# Patient Record
Sex: FEMALE | Race: WHITE | NOT HISPANIC OR LATINO | URBAN - METROPOLITAN AREA
[De-identification: names, ages, dates, MRNs, and addresses within clinical notes are randomized per-mention and may not be internally consistent; named-entity substitution may affect disease eponyms.]

---

## 2022-04-13 ENCOUNTER — APPOINTMENT (OUTPATIENT)
Age: 42
Setting detail: DERMATOLOGY
End: 2022-04-13

## 2022-04-13 DIAGNOSIS — Z41.9 ENCOUNTER FOR PROCEDURE FOR PURPOSES OTHER THAN REMEDYING HEALTH STATE, UNSPECIFIED: ICD-10-CM

## 2022-04-13 PROCEDURE — OTHER COSMETIC CONSULTATION: SKIN CARE PRODUCTS AND SERVICES: OTHER

## 2022-04-13 ASSESSMENT — LOCATION DETAILED DESCRIPTION DERM: LOCATION DETAILED: RIGHT INFERIOR MEDIAL FOREHEAD

## 2022-04-13 ASSESSMENT — LOCATION SIMPLE DESCRIPTION DERM: LOCATION SIMPLE: RIGHT FOREHEAD

## 2022-04-13 ASSESSMENT — LOCATION ZONE DERM: LOCATION ZONE: FACE

## 2022-04-20 ENCOUNTER — APPOINTMENT (OUTPATIENT)
Age: 42
Setting detail: DERMATOLOGY
End: 2022-04-22

## 2022-04-20 DIAGNOSIS — Z41.9 ENCOUNTER FOR PROCEDURE FOR PURPOSES OTHER THAN REMEDYING HEALTH STATE, UNSPECIFIED: ICD-10-CM

## 2022-04-20 PROCEDURE — OTHER ALMA LASER: OTHER

## 2022-04-20 ASSESSMENT — LOCATION SIMPLE DESCRIPTION DERM: LOCATION SIMPLE: LEFT FOREHEAD

## 2022-04-20 ASSESSMENT — LOCATION ZONE DERM: LOCATION ZONE: FACE

## 2022-04-20 ASSESSMENT — LOCATION DETAILED DESCRIPTION DERM: LOCATION DETAILED: LEFT INFERIOR MEDIAL FOREHEAD

## 2022-04-20 NOTE — PROCEDURE: ALMA LASER
Treatment Number: 1
Handpiece: Bipolar
Fluence (J/Cm2): 5
Total Energy (Kj): 0.1
Exposure Time (In Sec): 90
Pulse Mode (Optional): L
Power (Wright): Low (10w)
Er:Yag Post-Care Generic: Post care reviewed with patient.
Energy In Kj: 50
Spot Size In Mm: 3
Shr Post-Care: Patient tolerated treatment well.  Calm & Correct Serum was applied post-tx.
Handpiece: ForMune Pro 5
St Module Post-Care: Patient's skin was cleansed with mild gel wash.  Eight passes were performed entire face and neck, avoiding trachea, left side first, then right side with a thick layer of ultrasound gel.  Gel was removed, Calm & Correct serum and Lumiere eye cream applied with Journee SPF 30 post treatment.
Ipl Post-Care: Patient tolerated treatment well.  Calm & Correct Serum and SPF 46 applied post-tx
Nd:Yag Post-Care: Immediate endpoint whitening and pinpoint bleeding. Vaseline and ice applied. Post care reviewed with patient.
Consent: Written consent obtained, risks reviewed including but not limited to crusting, scabbing, blistering, scarring, darker or lighter pigmentary change, systemic reactions, ulceration, incidental hair removal, bruising, and/or incomplete removal.
Clear Lift Post-Care: Post care reviewed with patient. Patient should avoid direct sunlight and wear broad spectrum sunscreen daily.
External Cooling: Gini Cryo 5
Post-Care Instructions: I reviewed with the patient in detail post-care instructions. Patient should avoid sunlight and wear sun protection.
Energy (Mj/P): 600
Repetition Rate (Hz): 0.5
 Post-Care: Bikini - 18j/cm2-30mns  #1 of \\n$120 PAN (regular price $150)
Treatment Fluence In Mj: 15
Pulse Width: 30 ms
Pulse Type: I
Stacks: 0
Post-Care To Use?: Generic
Matrix Dots: 7 x 7
Fluence (J/Cm2): 7
Pulse Duration In Ms: 10
Energy In Mj: 400
Price (Use Numbers Only, No Special Characters Or $): 400.00
Frequency In Hz: Unibody
Frequency In Hz: 2
Detail Level: Zone

## 2022-05-16 ENCOUNTER — APPOINTMENT (OUTPATIENT)
Age: 42
Setting detail: DERMATOLOGY
End: 2022-05-21

## 2022-05-16 VITALS — TEMPERATURE: 98 F

## 2022-05-16 DIAGNOSIS — L71.8 OTHER ROSACEA: ICD-10-CM

## 2022-05-16 PROCEDURE — OTHER MIPS QUALITY: OTHER

## 2022-05-16 PROCEDURE — OTHER PRESCRIPTION: OTHER

## 2022-05-16 PROCEDURE — 99213 OFFICE O/P EST LOW 20 MIN: CPT

## 2022-05-16 PROCEDURE — OTHER COUNSELING: OTHER

## 2022-05-16 PROCEDURE — OTHER PRODUCT LINE (ELTA MD): OTHER

## 2022-05-16 RX ORDER — OXYMETAZOLINE HYDROCHLORIDE 1 G/100G
CREAM TOPICAL
Qty: 30 | Refills: 0 | Status: CANCELLED | COMMUNITY
Start: 2022-05-16

## 2022-05-16 RX ORDER — SULFACETAMIDE SODIUM, SULFUR 98; 48 MG/G; MG/G
LIQUID TOPICAL
Qty: 285 | Refills: 0 | Status: ERX | COMMUNITY
Start: 2022-05-16

## 2022-05-16 ASSESSMENT — LOCATION DETAILED DESCRIPTION DERM
LOCATION DETAILED: NASAL SUPRATIP
LOCATION DETAILED: LEFT INFERIOR CENTRAL MALAR CHEEK
LOCATION DETAILED: LEFT INFERIOR MEDIAL MALAR CHEEK
LOCATION DETAILED: LEFT CENTRAL MALAR CHEEK
LOCATION DETAILED: RIGHT INFERIOR CENTRAL MALAR CHEEK
LOCATION DETAILED: RIGHT CENTRAL MALAR CHEEK

## 2022-05-16 ASSESSMENT — LOCATION SIMPLE DESCRIPTION DERM
LOCATION SIMPLE: NOSE
LOCATION SIMPLE: LEFT CHEEK
LOCATION SIMPLE: RIGHT CHEEK

## 2022-05-16 ASSESSMENT — LOCATION ZONE DERM
LOCATION ZONE: FACE
LOCATION ZONE: NOSE

## 2022-05-16 NOTE — HPI: RASH (ROSACEA)
How Severe Is Your Rosacea?: moderate
Is This A New Presentation, Or A Follow-Up?: Rosacea
Additional History: Has had IPL

## 2022-05-16 NOTE — PROCEDURE: PRODUCT LINE (ELTA MD)
Product 23 Price (In Dollars - Numeric Only, No Special Characters Or $): 0.00
Product 19 Units: 0
Product 4 Application Directions: Apply to entire face every morning, after applying medications.
Send Charges To Patient Encounter: No
Name Of Product 2: UV Clear (untinted)
Name Of Product 1: UV Clear tinted
Name Of Product 3: UV Daily
Assigning Risk Information: Per AMA, level of risk is based upon consequences of the problem(s) addressed at the encounter when appropriately treated. Risk also includes medical decision making related to the need to initiate or forego further testing, treatment and/or hospitalization. Over the counter medication are assigned a risk level of low. Prescription medication management is assigned a risk level of moderate.
Product 1 Units: 1
Detail Level: Zone
Name Of Product 4: UV SPORT
Risk Of Complication Category: Low (OTC Medications)
Allow Plan To Count Towards E/M Coding: Yes

## 2022-05-18 ENCOUNTER — APPOINTMENT (OUTPATIENT)
Age: 42
Setting detail: DERMATOLOGY
End: 2022-05-19

## 2022-05-18 DIAGNOSIS — Z41.9 ENCOUNTER FOR PROCEDURE FOR PURPOSES OTHER THAN REMEDYING HEALTH STATE, UNSPECIFIED: ICD-10-CM

## 2022-05-18 PROCEDURE — OTHER ALMA LASER: OTHER

## 2022-05-18 ASSESSMENT — LOCATION DETAILED DESCRIPTION DERM: LOCATION DETAILED: RIGHT INFERIOR MEDIAL FOREHEAD

## 2022-05-18 ASSESSMENT — LOCATION SIMPLE DESCRIPTION DERM: LOCATION SIMPLE: RIGHT FOREHEAD

## 2022-05-18 ASSESSMENT — LOCATION ZONE DERM: LOCATION ZONE: FACE

## 2022-05-18 NOTE — PROCEDURE: ALMA LASER
Treatment Number: 1
Pulse Mode (Optional): L
Pulse Width: 30 ms
 Post-Care: Bikini - 18j/cm2-30mns  #1 of \\n$120 PAN (regular price $150)
Er:Yag Post-Care Generic: Post care reviewed with patient.
Frequency In Hz: 2
Nd:Yag Post-Care: Immediate endpoint whitening and pinpoint bleeding. Vaseline and ice applied. Post care reviewed with patient.
Stacks: 0
Fluence (J/Cm2): 7
Repetition Rate (Hz): 0.5
External Cooling Fan Speed: 5
Energy (Mj/P): 600
Post-Care To Use?: Generic
Spot Size In Mm: 3
Power In Wright: 50
Handpiece: Vizify Pro 5
Total Energy (Kj): 0.1
Power (Wright): Low (10w)
Shr Post-Care: Patient tolerated treatment well.  Calm & Correct Serum was applied post-tx.
Consent: Written consent obtained, risks reviewed including but not limited to crusting, scabbing, blistering, scarring, darker or lighter pigmentary change, systemic reactions, ulceration, incidental hair removal, bruising, and/or incomplete removal.
Matrix Dots: 7 x 7
Price (Use Numbers Only, No Special Characters Or $): 400.00
Ipl Post-Care: Patient tolerated treatment well.  Calm & Correct Serum and SPF 46 applied post-tx
Pulse Type: I
Clear Lift Post-Care: Post care reviewed with patient. Patient should avoid direct sunlight and wear broad spectrum sunscreen daily.
Energy In Mj: 400
Post-Care Instructions: I reviewed with the patient in detail post-care instructions. Patient should avoid sunlight and wear sun protection.
St Module Post-Care: Patient's skin was cleansed with mild gel wash.  Eight passes were performed entire face and neck, avoiding trachea, left side first, then right side with a thick layer of ultrasound gel.  Gel was removed, Calm & Correct serum and Lumiere eye cream applied with Journee SPF 30 post treatment.
Pulse Duration In Ms: 10
Exposure Time (In Sec): 90
Treatment Fluence In Mj: 15
Detail Level: Zone
Frequency In Hz: Unibody
External Cooling: Gini Cryo 5
Handpiece: Bipolar

## 2022-06-15 ENCOUNTER — APPOINTMENT (OUTPATIENT)
Age: 42
Setting detail: DERMATOLOGY
End: 2022-06-15

## 2022-06-15 DIAGNOSIS — Z41.9 ENCOUNTER FOR PROCEDURE FOR PURPOSES OTHER THAN REMEDYING HEALTH STATE, UNSPECIFIED: ICD-10-CM

## 2022-06-15 PROCEDURE — OTHER ALMA LASER: OTHER

## 2022-06-15 ASSESSMENT — LOCATION ZONE DERM: LOCATION ZONE: FACE

## 2022-06-15 ASSESSMENT — LOCATION SIMPLE DESCRIPTION DERM: LOCATION SIMPLE: INFERIOR FOREHEAD

## 2022-06-15 ASSESSMENT — LOCATION DETAILED DESCRIPTION DERM: LOCATION DETAILED: INFERIOR MID FOREHEAD

## 2022-06-15 NOTE — PROCEDURE: ALMA LASER
Pulse Width: 30 ms
Treatment Number: 1
Pulse Type: I
Er:Yag Post-Care Generic: Post care reviewed with patient.
External Cooling: Gini Cryo 5
St Module Post-Care: Patient's skin was cleansed with mild gel wash.  Eight passes were performed entire face and neck, avoiding trachea, left side first, then right side with a thick layer of ultrasound gel.  Gel was removed, Calm & Correct serum and Lumiere eye cream applied with Journee SPF 30 post treatment.
Stacks: 0
Detail Level: Zone
Matrix Dots: 7 x 7
Handpiece: View3 Pro 5
Post-Care To Use?: Generic
Fluence (Mj/Cm2): 7
Power In Wright: 50
Post-Care Instructions: I reviewed with the patient in detail post-care instructions. Patient should avoid sunlight and wear sun protection.
Energy (Mj/P): 600
Frequency In Hz: Unibody
Pulse Mode (Optional): L
Power (Wright): Low (10w)
Pulse Duration In Ms: 10
Repetition Rate (Hz): 0.5
Frequency In Hz: 2
Treatment Fluence In Mj: 15
Consent: Written consent obtained, risks reviewed including but not limited to crusting, scabbing, blistering, scarring, darker or lighter pigmentary change, systemic reactions, ulceration, incidental hair removal, bruising, and/or incomplete removal.
 Post-Care: Bikini - 18j/cm2-30mns  #1 of \\n$120 PAN (regular price $150)
Brant Post-Care: Immediate endpoint whitening and pinpoint bleeding. Vaseline and ice applied. Post care reviewed with patient.
Price (Use Numbers Only, No Special Characters Or $): 400.00
Handpiece: Bipolar
Spot Size In Mm: 3
Ipl Post-Care: Patient tolerated treatment well.  Calm & Correct Serum and SPF 46 applied post-tx
Shr Post-Care: Patient tolerated treatment well.  Calm & Correct Serum was applied post-tx.
Fluence (J/Cm2): 5
Exposure Time (In Sec): 90
Total Energy (Kj): 0.1
Clear Lift Post-Care: Post care reviewed with patient. Patient should avoid direct sunlight and wear broad spectrum sunscreen daily.
Energy In Mj: 400

## 2022-06-16 ENCOUNTER — APPOINTMENT (OUTPATIENT)
Age: 42
Setting detail: DERMATOLOGY
End: 2022-06-16

## 2022-06-16 DIAGNOSIS — L71.8 OTHER ROSACEA: ICD-10-CM

## 2022-06-16 PROCEDURE — OTHER MIPS QUALITY: OTHER

## 2022-06-16 PROCEDURE — OTHER TREATMENT REGIMEN: OTHER

## 2022-06-16 PROCEDURE — OTHER PRODUCT LINE (ELTA MD): OTHER

## 2022-06-16 PROCEDURE — 99213 OFFICE O/P EST LOW 20 MIN: CPT

## 2022-06-16 PROCEDURE — OTHER COUNSELING: OTHER

## 2022-06-16 PROCEDURE — OTHER PRESCRIPTION: OTHER

## 2022-06-16 RX ORDER — SULFACETAMIDE SODIUM, SULFUR 98; 48 MG/G; MG/G
LIQUID TOPICAL
Qty: 285 | Refills: 0 | Status: ERX

## 2022-06-16 RX ORDER — TAZAROTENE 0.45 MG/G
LOTION TOPICAL
Qty: 45 | Refills: 0 | Status: ERX | COMMUNITY
Start: 2022-06-16

## 2022-06-16 ASSESSMENT — LOCATION DETAILED DESCRIPTION DERM
LOCATION DETAILED: NASAL SUPRATIP
LOCATION DETAILED: LEFT INFERIOR MEDIAL MALAR CHEEK
LOCATION DETAILED: RIGHT CENTRAL MALAR CHEEK
LOCATION DETAILED: LEFT INFERIOR CENTRAL MALAR CHEEK
LOCATION DETAILED: LEFT CENTRAL MALAR CHEEK
LOCATION DETAILED: RIGHT INFERIOR CENTRAL MALAR CHEEK

## 2022-06-16 ASSESSMENT — LOCATION ZONE DERM
LOCATION ZONE: NOSE
LOCATION ZONE: FACE

## 2022-06-16 ASSESSMENT — LOCATION SIMPLE DESCRIPTION DERM
LOCATION SIMPLE: RIGHT CHEEK
LOCATION SIMPLE: NOSE
LOCATION SIMPLE: LEFT CHEEK

## 2022-06-16 NOTE — PROCEDURE: PRODUCT LINE (ELTA MD)
Product 13 Price (In Dollars - Numeric Only, No Special Characters Or $): 0.00
Send Charges To Patient Encounter: No
Allow Plan To Count Towards E/M Coding: Yes
Product 1 Units: 1
Product 4 Application Directions: Apply to entire face every morning, after applying medications.
Product 10 Units: 0
Name Of Product 2: UV Clear (untinted)
Name Of Product 4: UV SPORT
Name Of Product 3: UV Daily
Name Of Product 1: UV Clear tinted
Risk Of Complication Category: Low (OTC Medications)
Detail Level: Zone
Assigning Risk Information: Per AMA, level of risk is based upon consequences of the problem(s) addressed at the encounter when appropriately treated. Risk also includes medical decision making related to the need to initiate or forego further testing, treatment and/or hospitalization. Over the counter medication are assigned a risk level of low. Prescription medication management is assigned a risk level of moderate.

## 2022-06-16 NOTE — PROCEDURE: TREATMENT REGIMEN
Detail Level: Zone
Action 4: Continue
Plan: - Continue series 3 of 5 treatments of IPL (possibly more) with Dea\\n- stopping Arazlo 3 nights before
Discontinue Regimen: Rhofade (patient preference) since patient reported increased redness day after use
Initiate Regimen: Arazlo

## 2022-07-13 ENCOUNTER — APPOINTMENT (OUTPATIENT)
Age: 42
Setting detail: DERMATOLOGY
End: 2022-07-13

## 2022-07-13 DIAGNOSIS — Z41.9 ENCOUNTER FOR PROCEDURE FOR PURPOSES OTHER THAN REMEDYING HEALTH STATE, UNSPECIFIED: ICD-10-CM

## 2022-07-13 PROCEDURE — OTHER ALMA LASER: OTHER

## 2022-07-13 ASSESSMENT — LOCATION DETAILED DESCRIPTION DERM: LOCATION DETAILED: INFERIOR MID FOREHEAD

## 2022-07-13 ASSESSMENT — LOCATION ZONE DERM: LOCATION ZONE: FACE

## 2022-07-13 ASSESSMENT — LOCATION SIMPLE DESCRIPTION DERM: LOCATION SIMPLE: INFERIOR FOREHEAD

## 2022-07-13 NOTE — PROCEDURE: ALMA LASER
Pulse Type: I
Stacks: 0
Handpiece: ForceManager Pro 5
Treatment Number: 1
Post-Care Instructions: I reviewed with the patient in detail post-care instructions. Patient should avoid sunlight and wear sun protection.
Detail Level: Zone
External Cooling: Gini Cryo 5
Brant Post-Care: Immediate endpoint whitening and pinpoint bleeding. Vaseline and ice applied. Post care reviewed with patient.
Er:Yag Post-Care Generic: Post care reviewed with patient.
Matrix Dots: 7 x 7
Consent: Written consent obtained, risks reviewed including but not limited to crusting, scabbing, blistering, scarring, darker or lighter pigmentary change, systemic reactions, ulceration, incidental hair removal, bruising, and/or incomplete removal.
Post-Care To Use?: Generic
Spot Size In Mm: 3
 Post-Care: Bikini - 18j/cm2-30mns  #1 of \\n$120 PAN (regular price $150)
Pulse Mode (Optional): L
Frequency In Hz: 2
External Cooling Fan Speed: 5
Treatment Fluence In Mj: 15
Fluence (Mj/Cm2): 7
Exposure Time (In Sec): 90
Power (Wright): Low (10w)
Shr Post-Care: Patient tolerated treatment well.  Calm & Correct Serum was applied post-tx.
Power In Wright: 50
Ipl Post-Care: Patient tolerated treatment well.  Calm & Correct Serum and SPF 46 applied post-tx
Handpiece: Bipolar
Total Energy (Kj): 0.1
St Module Post-Care: Patient's skin was cleansed with mild gel wash.  Eight passes were performed entire face and neck, avoiding trachea, left side first, then right side with a thick layer of ultrasound gel.  Gel was removed, Calm & Correct serum and Lumiere eye cream applied with Journee SPF 30 post treatment.
Clear Lift Post-Care: Post care reviewed with patient. Patient should avoid direct sunlight and wear broad spectrum sunscreen daily.
Pulse Width: 30 ms
Energy (Mj/P): 600
Energy In Mj: 400
Frequency In Hz: Unibody
Pulse Duration In Ms: 10
Repetition Rate (Hz): 0.5
Price (Use Numbers Only, No Special Characters Or $): 400.00

## 2022-08-10 ENCOUNTER — APPOINTMENT (OUTPATIENT)
Age: 42
Setting detail: DERMATOLOGY
End: 2022-08-10

## 2022-08-10 DIAGNOSIS — Z41.9 ENCOUNTER FOR PROCEDURE FOR PURPOSES OTHER THAN REMEDYING HEALTH STATE, UNSPECIFIED: ICD-10-CM

## 2022-08-10 PROCEDURE — OTHER ALMA LASER: OTHER

## 2022-08-10 ASSESSMENT — LOCATION ZONE DERM: LOCATION ZONE: FACE

## 2022-08-10 ASSESSMENT — LOCATION SIMPLE DESCRIPTION DERM: LOCATION SIMPLE: INFERIOR FOREHEAD

## 2022-08-10 ASSESSMENT — LOCATION DETAILED DESCRIPTION DERM: LOCATION DETAILED: INFERIOR MID FOREHEAD

## 2022-08-10 NOTE — PROCEDURE: ALMA LASER
Number Of Pulses: 1
Ipl Post-Care: Patient tolerated treatment well.  Calm & Correct Serum and SPF 46 applied post-tx
Handpiece: Bipolar
Pixel Post-Care: Post care reviewed with patient.
St Module Post-Care: Patient's skin was cleansed with mild gel wash.  Eight passes were performed entire face and neck, avoiding trachea, left side first, then right side with a thick layer of ultrasound gel.  Gel was removed, Calm & Correct serum and Lumiere eye cream applied with Journee SPF 30 post treatment.
Stacks: 0
Brant Post-Care: Immediate endpoint whitening and pinpoint bleeding. Vaseline and ice applied. Post care reviewed with patient.
Energy In Kj: 50
Frequency In Hz: Unibody
Spot Size In Mm: 3
Consent: Written consent obtained, risks reviewed including but not limited to crusting, scabbing, blistering, scarring, darker or lighter pigmentary change, systemic reactions, ulceration, incidental hair removal, bruising, and/or incomplete removal.
Pulse Type: I
Frequency In Hz: 2
Fluence (J/Cm2): 5
Power (Wright): Low (10w)
Energy In Mj: 400
Post-Care Instructions: I reviewed with the patient in detail post-care instructions. Patient should avoid sunlight and wear sun protection.
Exposure Time (In Sec): 90
Shr Post-Care: Patient tolerated treatment well.  Calm & Correct Serum was applied post-tx.
Matrix Dots: 7 x 7
Price (Use Numbers Only, No Special Characters Or $): 400.00
Detail Level: Zone
Post-Care To Use?: Generic
Pulse Mode (Optional): L
Handpiece: AW-Energy Pro 5
Fluence (J/Cm2): 7
Clear Lift Post-Care: Post care reviewed with patient. Patient should avoid direct sunlight and wear broad spectrum sunscreen daily.
Pulse Duration In Ms: 10
 Post-Care: Bikini - 18j/cm2-30mns  #1 of \\n$120 PAN (regular price $150)
Total Energy (Kj): 0.1
External Cooling: Gini Cryo 5
Treatment Fluence In Mj: 15
Repetition Rate (Hz): 0.5
Energy (Mj/P): 600
Pulse Width: 30 ms

## 2022-08-17 ENCOUNTER — APPOINTMENT (OUTPATIENT)
Age: 42
Setting detail: DERMATOLOGY
End: 2022-08-17

## 2022-08-17 VITALS — TEMPERATURE: 98 F

## 2022-08-17 DIAGNOSIS — L50.1 IDIOPATHIC URTICARIA: ICD-10-CM

## 2022-08-17 PROCEDURE — OTHER MIPS QUALITY: OTHER

## 2022-08-17 PROCEDURE — 99213 OFFICE O/P EST LOW 20 MIN: CPT

## 2022-08-17 PROCEDURE — OTHER COUNSELING: OTHER

## 2022-08-17 PROCEDURE — OTHER OTC TREATMENT REGIMEN: OTHER

## 2022-08-17 PROCEDURE — OTHER COUNSELING: TOPICAL STEROIDS: OTHER

## 2022-08-17 PROCEDURE — OTHER PRESCRIPTION: OTHER

## 2022-08-17 PROCEDURE — OTHER REFERRAL: OTHER

## 2022-08-17 RX ORDER — CLOCORTOLONE PIVALATE 0 G/G
CREAM TOPICAL
Qty: 30 | Refills: 0 | Status: ERX | COMMUNITY
Start: 2022-08-17

## 2022-08-17 ASSESSMENT — LOCATION SIMPLE DESCRIPTION DERM
LOCATION SIMPLE: CHIN
LOCATION SIMPLE: LEFT LIP
LOCATION SIMPLE: RIGHT FOREHEAD
LOCATION SIMPLE: LEFT FOREHEAD
LOCATION SIMPLE: RIGHT CHEEK
LOCATION SIMPLE: RIGHT LIP
LOCATION SIMPLE: LEFT CHEEK
LOCATION SIMPLE: INFERIOR FOREHEAD
LOCATION SIMPLE: LEFT ANTERIOR NECK
LOCATION SIMPLE: NECK

## 2022-08-17 ASSESSMENT — LOCATION DETAILED DESCRIPTION DERM
LOCATION DETAILED: LEFT INFERIOR CENTRAL MALAR CHEEK
LOCATION DETAILED: LEFT SUPERIOR ANTERIOR NECK
LOCATION DETAILED: RIGHT INFERIOR MEDIAL FOREHEAD
LOCATION DETAILED: LEFT CENTRAL MALAR CHEEK
LOCATION DETAILED: RIGHT MEDIAL MALAR CHEEK
LOCATION DETAILED: LEFT MEDIAL BUCCAL CHEEK
LOCATION DETAILED: RIGHT INFERIOR LATERAL MALAR CHEEK
LOCATION DETAILED: RIGHT CHIN
LOCATION DETAILED: RIGHT SUPERIOR CENTRAL BUCCAL CHEEK
LOCATION DETAILED: RIGHT LATERAL MALAR CHEEK
LOCATION DETAILED: RIGHT LOWER CUTANEOUS LIP
LOCATION DETAILED: RIGHT INFERIOR CENTRAL MALAR CHEEK
LOCATION DETAILED: RIGHT INFERIOR PREAURICULAR CHEEK
LOCATION DETAILED: LEFT CENTRAL BUCCAL CHEEK
LOCATION DETAILED: RIGHT CENTRAL MALAR CHEEK
LOCATION DETAILED: LEFT CENTRAL ANTERIOR NECK
LOCATION DETAILED: LEFT CENTRAL LATERAL NECK
LOCATION DETAILED: RIGHT FOREHEAD
LOCATION DETAILED: LEFT SUPERIOR LATERAL NECK
LOCATION DETAILED: RIGHT INFERIOR CENTRAL BUCCAL CHEEK
LOCATION DETAILED: RIGHT SUPERIOR MEDIAL BUCCAL CHEEK
LOCATION DETAILED: LEFT LATERAL SUBMANDIBULAR CHEEK
LOCATION DETAILED: LEFT INFERIOR FOREHEAD
LOCATION DETAILED: INFERIOR MID FOREHEAD
LOCATION DETAILED: LEFT MEDIAL FOREHEAD
LOCATION DETAILED: LEFT LATERAL MALAR CHEEK
LOCATION DETAILED: LEFT INFERIOR MEDIAL MALAR CHEEK
LOCATION DETAILED: RIGHT INFERIOR MEDIAL MALAR CHEEK
LOCATION DETAILED: RIGHT SUPERIOR CENTRAL MALAR CHEEK
LOCATION DETAILED: LEFT MEDIAL MALAR CHEEK
LOCATION DETAILED: RIGHT INFERIOR FOREHEAD
LOCATION DETAILED: LEFT MID PREAURICULAR CHEEK
LOCATION DETAILED: LEFT LOWER CUTANEOUS LIP
LOCATION DETAILED: RIGHT SUPERIOR LATERAL BUCCAL CHEEK
LOCATION DETAILED: RIGHT CENTRAL BUCCAL CHEEK

## 2022-08-17 ASSESSMENT — LOCATION ZONE DERM
LOCATION ZONE: FACE
LOCATION ZONE: LIP
LOCATION ZONE: NECK

## 2022-08-17 NOTE — PROCEDURE: COUNSELING: TOPICAL STEROIDS
Detail Level: Zone
Topical Steroids Counseling: I explained that topical steroids are for safe to use on active rashes. I discussed with the patient that prolonged use of topical steroids can result in the increased appearance of superficial blood vessels (telangiectasias), lightening (hypopigmentation) and thinning of the skin (atrophy).  Patient understands to avoid using high potency steroids in skin folds, the groin or the face.  The patient verbalized understanding of the proper use and possible adverse effects of topical steroids.  All of the patient's questions and concerns were addressed.

## 2022-08-17 NOTE — HPI: HIVES (URTICARIA)
How Severe Are Your Hives?: moderate
Please Select The Phrase That Best Describes Your Hives.: individual welts do not stay in the same place for more than 24 hours
Is This A New Presentation, Or A Follow-Up?: Hives
Additional History: As per patient, hives have come and gone even well prior to being out on Plexion cleanser and arazlo for rosacea, feels it’s unrelated to rosacea/meds.

## 2022-08-17 NOTE — PROCEDURE: OTC TREATMENT REGIMEN
Detail Level: Zone
Patient Specific Otc Recommendations (Will Not Stick From Patient To Patient): La Roche Posay Toleriane moisturizer recommended

## 2022-10-03 ENCOUNTER — APPOINTMENT (OUTPATIENT)
Age: 42
Setting detail: DERMATOLOGY
End: 2022-10-05

## 2022-10-03 VITALS — TEMPERATURE: 98 F

## 2022-10-03 DIAGNOSIS — L50.1 IDIOPATHIC URTICARIA: ICD-10-CM

## 2022-10-03 DIAGNOSIS — L71.8 OTHER ROSACEA: ICD-10-CM

## 2022-10-03 PROCEDURE — OTHER TREATMENT REGIMEN: OTHER

## 2022-10-03 PROCEDURE — OTHER OTC TREATMENT REGIMEN: OTHER

## 2022-10-03 PROCEDURE — OTHER MIPS QUALITY: OTHER

## 2022-10-03 PROCEDURE — OTHER PRESCRIPTION: OTHER

## 2022-10-03 PROCEDURE — OTHER COUNSELING: TOPICAL STEROIDS: OTHER

## 2022-10-03 PROCEDURE — OTHER COUNSELING: OTHER

## 2022-10-03 PROCEDURE — 99213 OFFICE O/P EST LOW 20 MIN: CPT

## 2022-10-03 PROCEDURE — OTHER PRODUCT LINE (ELTA MD): OTHER

## 2022-10-03 RX ORDER — CLOCORTOLONE PIVALATE 0 G/G
CREAM TOPICAL
Qty: 30 | Refills: 0 | Status: CANCELLED

## 2022-10-03 RX ORDER — TAZAROTENE 0.45 MG/G
LOTION TOPICAL
Qty: 45 | Refills: 0 | Status: ERX

## 2022-10-03 RX ORDER — SULFACETAMIDE SODIUM, SULFUR 98; 48 MG/G; MG/G
LIQUID TOPICAL
Qty: 285 | Refills: 0 | Status: ERX

## 2022-10-03 ASSESSMENT — LOCATION ZONE DERM
LOCATION ZONE: FACE
LOCATION ZONE: NOSE
LOCATION ZONE: NECK

## 2022-10-03 ASSESSMENT — LOCATION DETAILED DESCRIPTION DERM
LOCATION DETAILED: LEFT SUPERIOR ANTERIOR NECK
LOCATION DETAILED: LEFT INFERIOR CENTRAL MALAR CHEEK
LOCATION DETAILED: LEFT SUPERIOR LATERAL NECK
LOCATION DETAILED: RIGHT MEDIAL MALAR CHEEK
LOCATION DETAILED: LEFT MEDIAL FOREHEAD
LOCATION DETAILED: RIGHT CENTRAL BUCCAL CHEEK
LOCATION DETAILED: NASAL SUPRATIP
LOCATION DETAILED: LEFT LATERAL SUBMANDIBULAR CHEEK
LOCATION DETAILED: LEFT MEDIAL MALAR CHEEK
LOCATION DETAILED: LEFT CENTRAL LATERAL NECK
LOCATION DETAILED: LEFT CENTRAL MALAR CHEEK
LOCATION DETAILED: RIGHT SUPERIOR MEDIAL BUCCAL CHEEK
LOCATION DETAILED: LEFT INFERIOR MEDIAL MALAR CHEEK
LOCATION DETAILED: RIGHT SUPERIOR CENTRAL BUCCAL CHEEK
LOCATION DETAILED: LEFT LATERAL MALAR CHEEK
LOCATION DETAILED: RIGHT CHIN
LOCATION DETAILED: LEFT MEDIAL BUCCAL CHEEK
LOCATION DETAILED: RIGHT INFERIOR FOREHEAD
LOCATION DETAILED: RIGHT INFERIOR LATERAL MALAR CHEEK
LOCATION DETAILED: RIGHT FOREHEAD
LOCATION DETAILED: RIGHT INFERIOR CENTRAL MALAR CHEEK
LOCATION DETAILED: LEFT CENTRAL BUCCAL CHEEK
LOCATION DETAILED: RIGHT CENTRAL MALAR CHEEK

## 2022-10-03 ASSESSMENT — LOCATION SIMPLE DESCRIPTION DERM
LOCATION SIMPLE: NOSE
LOCATION SIMPLE: RIGHT CHEEK
LOCATION SIMPLE: RIGHT FOREHEAD
LOCATION SIMPLE: LEFT ANTERIOR NECK
LOCATION SIMPLE: NECK
LOCATION SIMPLE: LEFT FOREHEAD
LOCATION SIMPLE: CHIN
LOCATION SIMPLE: LEFT CHEEK

## 2022-10-03 NOTE — PROCEDURE: TREATMENT REGIMEN
Action 4: Continue
Discontinue Regimen: Rhofade (patient preference) since patient reported increased redness day after use
Detail Level: Zone
Plan: - Continue series 3 of 5 treatments of IPL (possibly more) with Dea\\n- stopping Arazlo 3 nights before
Continue Regimen: IPL with Dea \\nPlexion cleanser \\nArazlo

## 2022-10-03 NOTE — PROCEDURE: OTC TREATMENT REGIMEN
Detail Level: Zone
Patient Specific Otc Recommendations (Will Not Stick From Patient To Patient): La Roche Posay Toleriane moisturizer recommended\\nContinue follow ups with allergist Abiola Broderick

## 2022-10-03 NOTE — PROCEDURE: PRODUCT LINE (ELTA MD)
Product 8 Price (In Dollars - Numeric Only, No Special Characters Or $): 0.00
Detail Level: Zone
Product 8 Units: 0
Product 2 Application Directions: Apply to entire face every morning, after applying medications.
Name Of Product 1: UV Clear tinted
Send Charges To Patient Encounter: No
Name Of Product 3: UV Daily
Allow Plan To Count Towards E/M Coding: Yes
Risk Of Complication Category: Low (OTC Medications)
Name Of Product 4: UV SPORT
Product 1 Units: 1
Assigning Risk Information: Per AMA, level of risk is based upon consequences of the problem(s) addressed at the encounter when appropriately treated. Risk also includes medical decision making related to the need to initiate or forego further testing, treatment and/or hospitalization. Over the counter medication are assigned a risk level of low. Prescription medication management is assigned a risk level of moderate.
Name Of Product 2: UV Clear (untinted)

## 2023-02-06 ENCOUNTER — APPOINTMENT (OUTPATIENT)
Age: 43
Setting detail: DERMATOLOGY
End: 2023-02-08

## 2023-02-06 DIAGNOSIS — L71.8 OTHER ROSACEA: ICD-10-CM

## 2023-02-06 PROCEDURE — OTHER COUNSELING: OTHER

## 2023-02-06 PROCEDURE — 99213 OFFICE O/P EST LOW 20 MIN: CPT

## 2023-02-06 PROCEDURE — OTHER PRESCRIPTION: OTHER

## 2023-02-06 PROCEDURE — OTHER MIPS QUALITY: OTHER

## 2023-02-06 PROCEDURE — OTHER TREATMENT REGIMEN: OTHER

## 2023-02-06 PROCEDURE — OTHER PRODUCT LINE (ELTA MD): OTHER

## 2023-02-06 RX ORDER — SULFACETAMIDE SODIUM, SULFUR 98; 48 MG/G; MG/G
LIQUID TOPICAL
Qty: 285 | Refills: 1 | Status: ERX

## 2023-02-06 RX ORDER — TAZAROTENE 0.45 MG/G
LOTION TOPICAL
Qty: 45 | Refills: 0 | Status: ERX

## 2023-02-06 ASSESSMENT — LOCATION SIMPLE DESCRIPTION DERM
LOCATION SIMPLE: NOSE
LOCATION SIMPLE: RIGHT CHEEK
LOCATION SIMPLE: LEFT CHEEK

## 2023-02-06 ASSESSMENT — LOCATION ZONE DERM
LOCATION ZONE: FACE
LOCATION ZONE: NOSE

## 2023-02-06 ASSESSMENT — LOCATION DETAILED DESCRIPTION DERM
LOCATION DETAILED: RIGHT INFERIOR CENTRAL MALAR CHEEK
LOCATION DETAILED: NASAL SUPRATIP
LOCATION DETAILED: RIGHT CENTRAL MALAR CHEEK
LOCATION DETAILED: LEFT INFERIOR MEDIAL MALAR CHEEK
LOCATION DETAILED: LEFT CENTRAL MALAR CHEEK
LOCATION DETAILED: LEFT INFERIOR CENTRAL MALAR CHEEK

## 2023-02-06 NOTE — PROCEDURE: PRODUCT LINE (ELTA MD)
Product 1 Application Directions: Apply to entire face every morning, after applying medications.
Product 5 Price (In Dollars - Numeric Only, No Special Characters Or $): 0.00
Product 49 Units: 0
Risk Of Complication Category: Low (OTC Medications)
Name Of Product 2: UV Clear (untinted)
Render Product Pricing In Note: No
Assigning Risk Information: Per AMA, level of risk is based upon consequences of the problem(s) addressed at the encounter when appropriately treated. Risk also includes medical decision making related to the need to initiate or forego further testing, treatment and/or hospitalization. Over the counter medication are assigned a risk level of low. Prescription medication management is assigned a risk level of moderate.
Product 1 Units: 1
Allow Plan To Count Towards E/M Coding: Yes
Name Of Product 1: UV Clear tinted
Name Of Product 4: UV SPORT
Detail Level: Zone
Name Of Product 3: UV Daily

## 2023-02-06 NOTE — PROCEDURE: TREATMENT REGIMEN
Discontinue Regimen: Rhofade (patient preference) since patient reported increased redness day after use
Action 3: Continue
Detail Level: Zone
Plan: - Continue series 3 of 5 treatments of IPL (possibly more) with Dea\\n- stopping Arazlo 3 nights before
Continue Regimen: IPL with Dea \\nPlexion cleanser \\nArazlo

## 2023-08-14 ENCOUNTER — APPOINTMENT (OUTPATIENT)
Age: 43
Setting detail: DERMATOLOGY
End: 2023-08-14

## 2023-08-14 DIAGNOSIS — L71.8 OTHER ROSACEA: ICD-10-CM

## 2023-08-14 PROCEDURE — OTHER COUNSELING: OTHER

## 2023-08-14 PROCEDURE — OTHER TREATMENT REGIMEN: OTHER

## 2023-08-14 PROCEDURE — OTHER MIPS QUALITY: OTHER

## 2023-08-14 PROCEDURE — 99213 OFFICE O/P EST LOW 20 MIN: CPT

## 2023-08-14 PROCEDURE — OTHER PRESCRIPTION: OTHER

## 2023-08-14 PROCEDURE — OTHER PRESCRIPTION MEDICATION MANAGEMENT: OTHER

## 2023-08-14 PROCEDURE — OTHER PRODUCT LINE (ELTA MD): OTHER

## 2023-08-14 RX ORDER — SULFACETAMIDE SODIUM, SULFUR 98; 48 MG/G; MG/G
LIQUID TOPICAL
Qty: 285 | Refills: 1 | Status: ERX

## 2023-08-14 RX ORDER — TAZAROTENE 0.45 MG/G
LOTION TOPICAL
Qty: 45 | Refills: 0 | Status: ERX

## 2023-08-14 ASSESSMENT — LOCATION DETAILED DESCRIPTION DERM
LOCATION DETAILED: RIGHT INFERIOR CENTRAL MALAR CHEEK
LOCATION DETAILED: RIGHT CENTRAL MALAR CHEEK
LOCATION DETAILED: LEFT INFERIOR MEDIAL MALAR CHEEK
LOCATION DETAILED: LEFT CENTRAL MALAR CHEEK
LOCATION DETAILED: NASAL SUPRATIP
LOCATION DETAILED: LEFT INFERIOR CENTRAL MALAR CHEEK

## 2023-08-14 ASSESSMENT — LOCATION SIMPLE DESCRIPTION DERM
LOCATION SIMPLE: NOSE
LOCATION SIMPLE: LEFT CHEEK
LOCATION SIMPLE: RIGHT CHEEK

## 2023-08-14 ASSESSMENT — LOCATION ZONE DERM
LOCATION ZONE: NOSE
LOCATION ZONE: FACE

## 2023-08-14 NOTE — PROCEDURE: TREATMENT REGIMEN
Continue Regimen: IPL with Dea \\nPlexion cleanser \\nArazlo
Action 2: Continue
Detail Level: Zone
Discontinue Regimen: Rhofade (patient preference) since patient reported increased redness day after use
Plan: - Continue series 3 of 5 treatments of IPL (possibly more) with Dea\\n- stopping Arazlo 3 nights before

## 2023-08-14 NOTE — PROCEDURE: PRESCRIPTION MEDICATION MANAGEMENT
Continue Regimen: Plexion 9.8 %-4.8 % topical cleanser \\nQuantity: 285.0 g  Days Supply: 30\\nSig: Wash face once to twice daily\\n\\nArazlo 0.045 % lotion \\nQuantity: 45.0 g  Days Supply: 30\\nSig: Apply pea size amount to entire face at night as directed
Detail Level: Zone
Render In Strict Bullet Format?: No
Samples Given: La rosche posay hydrating \\nCeraVe hydration

## 2024-04-09 ENCOUNTER — APPOINTMENT (OUTPATIENT)
Age: 44
Setting detail: DERMATOLOGY
End: 2024-04-10

## 2024-04-09 DIAGNOSIS — L71.8 OTHER ROSACEA: ICD-10-CM

## 2024-04-09 DIAGNOSIS — L50.1 IDIOPATHIC URTICARIA: ICD-10-CM

## 2024-04-09 DIAGNOSIS — B02.9 ZOSTER WITHOUT COMPLICATIONS: ICD-10-CM

## 2024-04-09 PROCEDURE — OTHER MIPS QUALITY: OTHER

## 2024-04-09 PROCEDURE — OTHER PRESCRIPTION: OTHER

## 2024-04-09 PROCEDURE — OTHER PRESCRIPTION MEDICATION MANAGEMENT: OTHER

## 2024-04-09 PROCEDURE — 99214 OFFICE O/P EST MOD 30 MIN: CPT

## 2024-04-09 PROCEDURE — OTHER COUNSELING: OTHER

## 2024-04-09 PROCEDURE — OTHER PATIENT SPECIFIC COUNSELING: OTHER

## 2024-04-09 RX ORDER — TAZAROTENE 0.45 MG/G
LOTION TOPICAL
Qty: 45 | Refills: 0 | Status: ERX | COMMUNITY
Start: 2024-04-09

## 2024-04-09 RX ORDER — VALACYCLOVIR HYDROCHLORIDE 1 G/1
TABLET, FILM COATED ORAL
Qty: 21 | Refills: 0 | Status: ERX | COMMUNITY
Start: 2024-04-09

## 2024-04-09 RX ORDER — MOMETASONE FUROATE 1 MG/G
CREAM TOPICAL
Qty: 45 | Refills: 0 | Status: ERX | COMMUNITY
Start: 2024-04-09

## 2024-04-09 RX ORDER — SULFACETAMIDE SODIUM, SULFUR 98; 48 MG/G; MG/G
LIQUID TOPICAL
Qty: 285 | Refills: 1 | Status: ERX | COMMUNITY
Start: 2024-04-09

## 2024-04-09 ASSESSMENT — LOCATION ZONE DERM
LOCATION ZONE: NOSE
LOCATION ZONE: FACE
LOCATION ZONE: TRUNK
LOCATION ZONE: NECK

## 2024-04-09 ASSESSMENT — LOCATION SIMPLE DESCRIPTION DERM
LOCATION SIMPLE: LEFT ANTERIOR NECK
LOCATION SIMPLE: NOSE
LOCATION SIMPLE: RIGHT CHEEK
LOCATION SIMPLE: LEFT CHEEK
LOCATION SIMPLE: T9 LEFT POSTERIOR DERMATOME

## 2024-04-09 ASSESSMENT — LOCATION DETAILED DESCRIPTION DERM
LOCATION DETAILED: RIGHT CENTRAL MALAR CHEEK
LOCATION DETAILED: LEFT INFERIOR MEDIAL MALAR CHEEK
LOCATION DETAILED: T9 LEFT POSTERIOR DERMATOME
LOCATION DETAILED: LEFT INFERIOR CENTRAL MALAR CHEEK
LOCATION DETAILED: LEFT CENTRAL MALAR CHEEK
LOCATION DETAILED: NASAL SUPRATIP
LOCATION DETAILED: LEFT INFERIOR ANTERIOR NECK
LOCATION DETAILED: RIGHT INFERIOR CENTRAL MALAR CHEEK

## 2024-04-09 NOTE — PROCEDURE: PRESCRIPTION MEDICATION MANAGEMENT
Continue Regimen: - Plexion 9.8 %-4.8 % topical cleanser : Wash face once to twice daily\\n- Arazlo 0.045 % lotion : Apply pea size amount to entire face at night as directed\\n- IPL
Detail Level: Zone
Render In Strict Bullet Format?: No
Samples Given: La rosche posay hydrating \\nCeraVe hydration
Initiate Treatment: mometasone 0.1 % topical cream \\nQuantity: 45.0 g  Days Supply: 30\\nSig: Apply to itchy red rash on neck and body twice daily for up to two weeks as needed
Discontinue Regimen: - Clocortolone pivalate 0.1 % topical cream : Apply to itchy dry red rash on face and neck twice daily for up to 2 weeks as needed

## 2024-04-09 NOTE — PROCEDURE: COUNSELING
Detail Level: Detailed
Sunscreen Recommendation Label Override: Sunscreen SPF 40-50
Detail Level: Zone
Antihistamine Recommendations: Over-the-counter Nondrowsy oral antihistamine such as Claritin, Zyrtec or Allegra. Take one tablet daily.

## 2024-04-09 NOTE — PROCEDURE: PATIENT SPECIFIC COUNSELING
Detail Level: Zone
Patient reports blood in urine. Patient is being worked up for a left sided kidney stone. Patient has a CT scan today or tomorrow. I explained that Patient could be having pain due to shingles but also left sided kidney stone

## 2025-04-18 ENCOUNTER — APPOINTMENT (OUTPATIENT)
Age: 45
Setting detail: DERMATOLOGY
End: 2025-04-18

## 2025-04-18 DIAGNOSIS — L71.8 OTHER ROSACEA: ICD-10-CM

## 2025-04-18 PROCEDURE — 99213 OFFICE O/P EST LOW 20 MIN: CPT

## 2025-04-18 PROCEDURE — OTHER PRESCRIPTION MEDICATION MANAGEMENT: OTHER

## 2025-04-18 PROCEDURE — OTHER COUNSELING: OTHER

## 2025-04-18 PROCEDURE — OTHER MIPS QUALITY: OTHER

## 2025-04-18 PROCEDURE — OTHER PRESCRIPTION: OTHER

## 2025-04-18 RX ORDER — TAZAROTENE 0.45 MG/G
LOTION TOPICAL
Qty: 45 | Refills: 1 | Status: ERX

## 2025-04-18 RX ORDER — SULFACETAMIDE SODIUM, SULFUR 98; 48 MG/G; MG/G
LIQUID TOPICAL
Qty: 285 | Refills: 3 | Status: ERX

## 2025-04-18 ASSESSMENT — LOCATION DETAILED DESCRIPTION DERM
LOCATION DETAILED: RIGHT CENTRAL MALAR CHEEK
LOCATION DETAILED: RIGHT INFERIOR CENTRAL MALAR CHEEK
LOCATION DETAILED: LEFT INFERIOR MEDIAL MALAR CHEEK
LOCATION DETAILED: LEFT INFERIOR CENTRAL MALAR CHEEK
LOCATION DETAILED: LEFT CENTRAL MALAR CHEEK
LOCATION DETAILED: NASAL SUPRATIP

## 2025-04-18 ASSESSMENT — LOCATION SIMPLE DESCRIPTION DERM
LOCATION SIMPLE: NOSE
LOCATION SIMPLE: LEFT CHEEK
LOCATION SIMPLE: RIGHT CHEEK

## 2025-04-18 ASSESSMENT — LOCATION ZONE DERM
LOCATION ZONE: FACE
LOCATION ZONE: NOSE

## 2025-04-18 NOTE — PROCEDURE: PRESCRIPTION MEDICATION MANAGEMENT
Continue Regimen: - Plexion 9.8 %-4.8 % topical cleanser : Wash face once to twice daily\\n- Arazlo 0.045 % lotion : Apply pea size amount to entire face at night as directed\\n- IPL
Detail Level: Zone
Render In Strict Bullet Format?: No